# Patient Record
Sex: FEMALE | Race: OTHER | Employment: STUDENT | URBAN - METROPOLITAN AREA
[De-identification: names, ages, dates, MRNs, and addresses within clinical notes are randomized per-mention and may not be internally consistent; named-entity substitution may affect disease eponyms.]

---
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
## 2022-07-06 ENCOUNTER — HOSPITAL ENCOUNTER (OUTPATIENT)
Dept: PERIOP | Facility: HOSPITAL | Age: 22
Discharge: HOME | End: 2022-07-06
Attending: FAMILY MEDICINE
Payer: COMMERCIAL

## 2022-07-06 ENCOUNTER — HOSPITAL ENCOUNTER (OUTPATIENT)
Dept: CT IMAGING | Facility: HOSPITAL | Age: 22
Discharge: HOME | End: 2022-07-06
Attending: FAMILY MEDICINE
Payer: COMMERCIAL

## 2022-07-06 DIAGNOSIS — Z86.16 HISTORY OF 2019 NOVEL CORONAVIRUS DISEASE (COVID-19): ICD-10-CM

## 2022-07-06 DIAGNOSIS — R06.00 DOE (DYSPNEA ON EXERTION): ICD-10-CM

## 2022-07-06 LAB — CREAT BLD-MCNC: 0.8 MG/DL

## 2022-07-06 PROCEDURE — 71275 CT ANGIOGRAPHY CHEST: CPT | Performed by: FAMILY MEDICINE

## 2022-07-06 PROCEDURE — 82565 ASSAY OF CREATININE: CPT

## 2022-10-27 ENCOUNTER — OFFICE VISIT (OUTPATIENT)
Dept: FAMILY MEDICINE CLINIC | Facility: CLINIC | Age: 22
End: 2022-10-27
Payer: COMMERCIAL

## 2022-10-27 VITALS
HEART RATE: 80 BPM | OXYGEN SATURATION: 98 % | WEIGHT: 231 LBS | RESPIRATION RATE: 16 BRPM | DIASTOLIC BLOOD PRESSURE: 68 MMHG | BODY MASS INDEX: 37.12 KG/M2 | TEMPERATURE: 98 F | HEIGHT: 66 IN | SYSTOLIC BLOOD PRESSURE: 118 MMHG

## 2022-10-27 DIAGNOSIS — Z30.41 ENCOUNTER FOR SURVEILLANCE OF CONTRACEPTIVE PILLS: ICD-10-CM

## 2022-10-27 DIAGNOSIS — K21.9 GASTROESOPHAGEAL REFLUX DISEASE, UNSPECIFIED WHETHER ESOPHAGITIS PRESENT: ICD-10-CM

## 2022-10-27 DIAGNOSIS — N91.1 SECONDARY AMENORRHEA: ICD-10-CM

## 2022-10-27 DIAGNOSIS — Z00.00 WELL WOMAN EXAM (NO GYNECOLOGICAL EXAM): Primary | ICD-10-CM

## 2022-10-27 DIAGNOSIS — F32.1 CURRENT MODERATE EPISODE OF MAJOR DEPRESSIVE DISORDER WITHOUT PRIOR EPISODE (HCC): ICD-10-CM

## 2022-10-27 DIAGNOSIS — F41.9 ANXIETY: ICD-10-CM

## 2022-10-27 PROBLEM — G93.2 PSEUDOTUMOR CEREBRI: Status: ACTIVE | Noted: 2022-10-27

## 2022-10-27 PROCEDURE — 90471 IMMUNIZATION ADMIN: CPT | Performed by: FAMILY MEDICINE

## 2022-10-27 PROCEDURE — 3074F SYST BP LT 130 MM HG: CPT | Performed by: FAMILY MEDICINE

## 2022-10-27 PROCEDURE — 3008F BODY MASS INDEX DOCD: CPT | Performed by: FAMILY MEDICINE

## 2022-10-27 PROCEDURE — 99385 PREV VISIT NEW AGE 18-39: CPT | Performed by: FAMILY MEDICINE

## 2022-10-27 PROCEDURE — 3078F DIAST BP <80 MM HG: CPT | Performed by: FAMILY MEDICINE

## 2022-10-27 PROCEDURE — 90686 IIV4 VACC NO PRSV 0.5 ML IM: CPT | Performed by: FAMILY MEDICINE

## 2022-10-27 RX ORDER — TOPIRAMATE 25 MG/1
25 TABLET ORAL 2 TIMES DAILY
Qty: 180 TABLET | Refills: 2 | Status: CANCELLED | OUTPATIENT
Start: 2022-10-27

## 2022-10-27 RX ORDER — ESCITALOPRAM OXALATE 20 MG/1
20 TABLET ORAL DAILY
Qty: 90 TABLET | Refills: 1 | Status: SHIPPED | OUTPATIENT
Start: 2022-10-27 | End: 2023-10-22

## 2022-10-27 RX ORDER — NICOTINE POLACRILEX 4 MG/1
1 GUM, CHEWING ORAL DAILY
Qty: 30 TABLET | Refills: 0 | Status: SHIPPED | OUTPATIENT
Start: 2022-10-27 | End: 2022-11-26

## 2022-10-27 RX ORDER — ACETAZOLAMIDE 500 MG/1
500 CAPSULE, EXTENDED RELEASE ORAL 2 TIMES DAILY
Qty: 180 CAPSULE | Refills: 0 | Status: CANCELLED | OUTPATIENT
Start: 2022-10-27

## 2022-10-27 RX ORDER — ACETAZOLAMIDE 250 MG/1
250 TABLET ORAL 3 TIMES DAILY
COMMUNITY
Start: 2022-09-17

## 2022-10-27 RX ORDER — ACETAZOLAMIDE 250 MG/1
250 TABLET ORAL 3 TIMES DAILY
Refills: 0 | Status: CANCELLED | OUTPATIENT
Start: 2022-10-27

## 2022-10-27 RX ORDER — NORGESTIMATE AND ETHINYL ESTRADIOL 7DAYSX3 28
1 KIT ORAL DAILY
Qty: 84 TABLET | Refills: 3 | Status: SHIPPED | OUTPATIENT
Start: 2022-10-27

## 2022-11-27 DIAGNOSIS — K21.9 GASTROESOPHAGEAL REFLUX DISEASE, UNSPECIFIED WHETHER ESOPHAGITIS PRESENT: ICD-10-CM

## 2022-11-28 RX ORDER — OMEPRAZOLE 20 MG/1
CAPSULE, DELAYED RELEASE ORAL
Qty: 30 CAPSULE | Refills: 0 | Status: SHIPPED | OUTPATIENT
Start: 2022-11-28

## 2022-12-23 DIAGNOSIS — K21.9 GASTROESOPHAGEAL REFLUX DISEASE, UNSPECIFIED WHETHER ESOPHAGITIS PRESENT: ICD-10-CM

## 2022-12-23 RX ORDER — OMEPRAZOLE 20 MG/1
CAPSULE, DELAYED RELEASE ORAL
Qty: 30 CAPSULE | Refills: 0 | OUTPATIENT
Start: 2022-12-23

## 2022-12-31 ENCOUNTER — PATIENT MESSAGE (OUTPATIENT)
Dept: FAMILY MEDICINE CLINIC | Facility: CLINIC | Age: 22
End: 2022-12-31

## 2022-12-31 DIAGNOSIS — K21.9 GASTROESOPHAGEAL REFLUX DISEASE, UNSPECIFIED WHETHER ESOPHAGITIS PRESENT: Primary | ICD-10-CM

## 2023-01-03 RX ORDER — FAMOTIDINE 20 MG/1
20 TABLET, FILM COATED ORAL 2 TIMES DAILY
Qty: 180 TABLET | Refills: 0 | Status: SHIPPED | OUTPATIENT
Start: 2023-01-03

## 2023-01-03 NOTE — TELEPHONE ENCOUNTER
From: Thierry Walters  To: [id number], DO  Sent: 12/31/2022 10:01 AM CST  Subject: New medicine to replace omeprazole    Hello,     I have been taking omeprazole for over a month now so I do need to switch to something else. I do still need something bc when I tried to stop taking it about a week ago, my symptoms came back. Do you know what else I can be prescribed?      Thank you,  Imani Foy

## 2023-04-13 DIAGNOSIS — K21.9 GASTROESOPHAGEAL REFLUX DISEASE, UNSPECIFIED WHETHER ESOPHAGITIS PRESENT: ICD-10-CM

## 2023-04-18 RX ORDER — FAMOTIDINE 20 MG/1
TABLET, FILM COATED ORAL
Qty: 180 TABLET | Refills: 0 | Status: SHIPPED | OUTPATIENT
Start: 2023-04-18

## 2023-05-19 DIAGNOSIS — F32.1 CURRENT MODERATE EPISODE OF MAJOR DEPRESSIVE DISORDER WITHOUT PRIOR EPISODE (HCC): ICD-10-CM

## 2023-05-19 DIAGNOSIS — F41.9 ANXIETY: ICD-10-CM

## 2023-05-21 RX ORDER — ESCITALOPRAM OXALATE 20 MG/1
20 TABLET ORAL DAILY
Qty: 90 TABLET | Refills: 1 | Status: SHIPPED | OUTPATIENT
Start: 2023-05-21 | End: 2024-05-15

## 2023-10-15 NOTE — PROGRESS NOTES
CHIEF COMPLAINT:   Patient presents with:  Cough        HPI:   Lexus Bui is a 14year old female who presents for cough for one month. Productive and dry cough. Pain with breathing in. Breathing deep triggers a cough. Cough occurs in fits.  Worse a Pain management, allowed time to go to smoke to help keep calm and compliant with treatment, encouraged to voice any concerns  Problem: Adult Inpatient Plan of Care  Goal: Plan of Care Review  Outcome: Ongoing, Progressing  Flowsheets (Taken 10/15/2023 1810)  Plan of Care Reviewed With: patient  Goal: Patient-Specific Goal (Individualized)  Outcome: Ongoing, Progressing  Flowsheets (Taken 10/15/2023 1810)  Anxieties, Fears or Concerns: withdrawal / pain/ smoking  Individualized Care Needs: pain needs monitoring for s/s withdrawal  Goal: Absence of Hospital-Acquired Illness or Injury  Outcome: Ongoing, Progressing  Intervention: Identify and Manage Fall Risk  Flowsheets (Taken 10/15/2023 1810)  Safety Promotion/Fall Prevention:   assistive device/personal item within reach   side rails raised x 2  Intervention: Prevent Skin Injury  Flowsheets (Taken 10/15/2023 1810)  Body Position: position changed independently  Skin Protection: adhesive use limited  Intervention: Prevent and Manage VTE (Venous Thromboembolism) Risk  Flowsheets (Taken 10/15/2023 1810)  Range of Motion: active ROM (range of motion) encouraged  Goal: Optimal Comfort and Wellbeing  Outcome: Ongoing, Progressing  Intervention: Monitor Pain and Promote Comfort  Flowsheets (Taken 10/15/2023 1810)  Pain Management Interventions:   around-the-clock dosing utilized   awakened for pain meds per patient request   medication offered   relaxation techniques promoted   quiet environment facilitated  Intervention: Provide Person-Centered Care  Flowsheets (Taken 10/15/2023 1810)  Trust Relationship/Rapport:   care explained   choices provided   emotional support provided   empathic listening provided   questions answered   questions encouraged   reassurance provided   thoughts/feelings acknowledged  Goal: Readiness for Transition of Care  Outcome: Ongoing, Progressing     Problem: Impaired Wound Healing  Goal: Optimal Wound Healing  Outcome: Ongoing, Progressing    distress  SKIN: no rashes, no suspicious lesions  EYES: Conjunctiva clear. No scleral icterus. HENT: Atraumatic, normocephalic. TM's pearly gray, no bulging, no fluid b/l. Nostrils patent, nasal mucosa pink and non-inflamed. No erythema of the throat.

## 2023-10-26 DIAGNOSIS — Z30.41 ENCOUNTER FOR SURVEILLANCE OF CONTRACEPTIVE PILLS: ICD-10-CM

## 2023-10-26 RX ORDER — NORGESTIMATE AND ETHINYL ESTRADIOL 7DAYSX3 28
1 KIT ORAL DAILY
Qty: 84 TABLET | Refills: 3 | Status: SHIPPED | OUTPATIENT
Start: 2023-10-26

## 2023-10-26 NOTE — TELEPHONE ENCOUNTER
Requested Prescriptions     Signed Prescriptions Disp Refills    NORGESTIM-ETH ESTRAD TRIPHASIC 0.18/0.215/0.25 MG-35 MCG Oral Tab 84 tablet 3     Sig: TAKE 1 TABLET BY MOUTH EVERY DAY     Authorizing Provider: VINAY JEFFERSON     Ordering User: NANCY ROWLEY 10/27/2022     Patient was asked to follow-up in: Follow-up not documented in note    Appointment scheduled: Visit date not found     Medication refilled per protocol.

## 2023-11-16 DIAGNOSIS — F41.9 ANXIETY: ICD-10-CM

## 2023-11-16 DIAGNOSIS — F32.1 CURRENT MODERATE EPISODE OF MAJOR DEPRESSIVE DISORDER WITHOUT PRIOR EPISODE (HCC): ICD-10-CM

## 2023-11-16 NOTE — TELEPHONE ENCOUNTER
Refill request for:    Requested Prescriptions     Pending Prescriptions Disp Refills    ESCITALOPRAM 20 MG Oral Tab [Pharmacy Med Name: ESCITALOPRAM 20 MG TABLET] 90 tablet 1     Sig: TAKE 1 TABLET (20 MG TOTAL) BY MOUTH IN THE MORNING        Last Prescribed Quantity Refills   05/21/23 90TABS 1     LOV 10/27/22    Patient was asked to follow-up in: one year    Appointment due: October 2023    Appointment scheduled: Visit date not found    Medication not on protocol.      Routed to DO Sabrina for review  Routed to  to schedule RTC

## 2023-11-20 RX ORDER — ESCITALOPRAM OXALATE 20 MG/1
20 TABLET ORAL DAILY
Qty: 90 TABLET | Refills: 0 | Status: SHIPPED | OUTPATIENT
Start: 2023-11-20 | End: 2024-11-14

## 2024-02-14 NOTE — PROGRESS NOTES
HISTORY OF PRESENT ILLNESS  Chief Complaint   Patient presents with    Weight Problem     Recommendation from mom, no meds before, open for med.        Ericka Berger is a 24 year old female new to our office today for initiation of medical weight loss program.  Patient presents today with c/o excess weight.     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   Through middle school and high school did not have weight issues   Once covid hit and was home all the time and felt that she was eating junk food and started bad habits         Reason/goal for weight loss:  baseline weight 180 lb     Previous weight loss efforts in the past/medication:  tried exercise and in 2021 would go often with her mom and felt she lost 15-20 lb but felt that it was hard to maintain  Last year  would go thorugh her boyfriend then got busy and stopped going    Interest in Bariatric surgery: n/a     Barriers to weight loss: n/a           Wt Readings from Last 6 Encounters:   02/20/24 245 lb (111.1 kg)   10/27/22 231 lb (104.8 kg)   03/16/22 210 lb (95.3 kg)   12/15/21 196 lb (88.9 kg) (97%, Z= 1.88)*   11/01/21 197 lb (89.4 kg) (97%, Z= 1.90)*   08/12/20 198 lb (89.8 kg) (97%, Z= 1.94)*     * Growth percentiles are based on CDC (Girls, 2-20 Years) data.          Breakfast Lunch Dinner Snacks Fluids   Works in the morning at a [employer] shop     Frozen chicken nuggets         Does struggle with hunger throughout day   Feels her hunger is all or nothing       Social hx and lifestyle reviewed:    Work: college  Marital status: boyfriend, live together   Support: good   Tobacco use: neg  ETOH use: negative  Supplements: neg  Exercise: negative  Stress level: 4/10  Sleep hours and integrity:  8-9 hours on average     MEDICAL HISTORY  PMH reviewed:   Cardiac disorders:negative    Depression/anxiety:  YES   Glaucoma: negative   Kidney stones: negative   Eating disorder: negative   Migraines: Pseudotumor cerebri : diagnosed  summer 2020    Seizures: negative   Joint-related conditions:negative   Liver disease: negative   Renal disease: negative   Diabetes: negative  Thyroid disease: negative   Constipation: negative  DVT: negative    Family or personal history of Pancreatic issues / Medullary Thyroid Cancer: negative    History of bariatric surgery: negative     FMH reviewed: obesity in parent/s or sibling: YES     REVIEW OF SYSTEMS  GENERAL: feels well otherwise,   NECK: denies thickening   LUNGS: denies shortness of breath with exertion, no apnea   CARDIOVASCULAR: denies chest pain on exertion , denies palpitations or pedal edema  GI: denies abdominal pain.  No N/V/D/C  MUSCULOSKELETAL: denies joint pains   NEURO: denies headaches   PSYCH: denies change in behavior or mood, denies feeling sad or depressed     EXAM  /80   Pulse 81   Resp 16   Ht 5' 5\" (1.651 m)   Wt 245 lb (111.1 kg)   BMI 40.77 kg/m² ,   GENERAL: well developed, well nourished, in no apparent distress,   SKIN: warm, pink, dry without rashes to exposed area   EYES: conjunctiva pink, sclera non icteric, PERRL  HEENT: atraumatic, normocephalic, O/p: Mallampati score-   NECK: supple, non tender, no adenopathy, no thyromegaly,   LUNGS: CTA in all fields, breathing non labored  CARDIO: RRR without murmur, normal S1 and S2 without clicks or gallops, no pedal edema.  GI: rotund, no masses, HSM or tenderness  MUSCULOSKELETAL: grossly intact   NEURO: Oriented times three, full ROM of bilateral UE/LE  PSYCH: pleasant, cooperative, normal mood and affect,     Lab Results   Component Value Date    WBC 5.7 04/01/2017    RBC 4.93 (H) 04/01/2017    HGB 14.2 04/01/2017    HCT 41.4 04/01/2017    MCV 84.0 04/01/2017    MCH 28.8 04/01/2017    MCHC 34.3 04/01/2017    RDW 12.3 04/01/2017    .0 04/01/2017     Lab Results   Component Value Date    GLU 91 12/15/2021    BUN 12.0 12/15/2021    BUNCREA 17.0 12/15/2021    CREATSERUM 0.69 12/15/2021    ANIONGAP 7 08/31/2019     GFR 83 04/01/2017    GFRNAA 106 07/06/2022    GFRAA 123 07/06/2022    CA 9.5 12/15/2021    OSMOCALC 280 08/31/2019    ALKPHO 73 12/15/2021    AST 16 12/15/2021    ALT 9 12/15/2021    BILT 0.27 12/15/2021    TP 7.2 12/15/2021    ALB 4.3 12/15/2021    GLOBULIN 4.0 08/31/2019     12/15/2021    K 4.14 12/15/2021     12/15/2021    CO2 18.4 (L) 12/15/2021     No results found for: \"EAG\", \"A1C\"  Lab Results   Component Value Date    CHOLEST 119 04/01/2017    TRIG 53 04/01/2017    HDL 46 04/01/2017    LDL 62 04/01/2017    VLDL 11 04/01/2017    TCHDLRATIO 2.59 04/01/2017    NONHDLC 73 04/01/2017     Lab Results   Component Value Date    TSH 1.710 04/01/2017     No results found for: \"B12\", \"VITB12\"  Lab Results   Component Value Date    VITD 23.9 (L) 04/01/2017       Current Outpatient Medications on File Prior to Visit   Medication Sig Dispense Refill    Acetylcysteine 600 MG Oral Cap Take 600 mg by mouth 2 (two) times daily.      escitalopram 20 MG Oral Tab TAKE 1 TABLET (20 MG TOTAL) BY MOUTH IN THE MORNING 90 tablet 0    NORGESTIM-ETH ESTRAD TRIPHASIC 0.18/0.215/0.25 MG-35 MCG Oral Tab TAKE 1 TABLET BY MOUTH EVERY DAY 84 tablet 3    acetaZOLAMIDE 250 MG Oral Tab Take 1 tablet (250 mg total) by mouth 3 (three) times daily.      TOPIRAMATE 25 MG Oral Tab TAKE 1 TABLET BY MOUTH TWICE A  tablet 2     No current facility-administered medications on file prior to visit.       ASSESSMENT  Initial Weight Data and Goal Weight Loss:       Diagnoses and all orders for this visit:    Pseudotumor cerebri  -     B12 AND FOLATE; Future  -     Vitamin D; Future  -     [facility] Medical Nutrition Therapy DIETITIAN - [place] [facility]    Therapeutic drug monitoring  -     B12 AND FOLATE; Future  -     Vitamin D; Future  -     [facility] Medical Nutrition Therapy DIETITIAN - [place] [facility]    Morbid obesity (HCC)  -     B12 AND FOLATE; Future  -     Vitamin D; Future  -      [facility] Medical Nutrition Therapy DIETITIAN - [place] Location    Acanthosis nigricans  -     B12 AND FOLATE; Future  -     Vitamin D; Future  -     [facility] Medical Nutrition Therapy DIETITIAN - [place] [facility]    Vitamin D deficiency  -     B12 AND FOLATE; Future  -     Vitamin D; Future  -     [facility] Medical Nutrition Therapy DIETITIAN - [place] [facility]    Other orders  -     Tirzepatide-Weight Management (ZEPBOUND) 2.5 MG/0.5ML Subcutaneous Solution Auto-injector; Inject 2.5 mg into the skin once a week.  -     Tirzepatide-Weight Management (ZEPBOUND) 5 MG/0.5ML Subcutaneous Solution Auto-injector; Inject 5 mg into the skin once a week.        PLAN  Medication use and side effects reviewed with patient.  Medication contraindications: n/a   Reviewed A1c and fasting insuline   Recheck vitamin D  Check b12 and folate   Trial of zepbound  -advised of side effects and adverse effects of this medication  Injection teaching done in [facility]   Referral to dietitian   Hold on [facility]   Reviewed weight loss and improvement of pseudotumor cerebri  CANNOT TAKE WEGOVY or SAXENDA due to national back order  CANNOT TAKE QSYMIA due to increase in intracranial presssure   CANNOT TAKE CONTRAVE, on lexapro for deppression  Follow up with dietitian and psychologist as recommended.  Discussed the role of sleep and stress in weight management.  Labs orders as above.  Counseled on comprehensive weight loss plan including attention to nutrition, exercise and behavior/stress management for success. See patient instruction below for more details.  Weight Loss Consent to treat reviewed and signed.    There are no Patient Instructions on file for this visit.    No follow-ups on file.    Patient verbalizes understanding.    Jolie Batista MD

## 2024-02-19 DIAGNOSIS — F41.9 ANXIETY: ICD-10-CM

## 2024-02-19 DIAGNOSIS — F32.1 CURRENT MODERATE EPISODE OF MAJOR DEPRESSIVE DISORDER WITHOUT PRIOR EPISODE (HCC): ICD-10-CM

## 2024-02-20 ENCOUNTER — OFFICE VISIT (OUTPATIENT)
Dept: INTERNAL MEDICINE CLINIC | Facility: CLINIC | Age: 24
End: 2024-02-20
Payer: COMMERCIAL

## 2024-02-20 VITALS
WEIGHT: 245 LBS | DIASTOLIC BLOOD PRESSURE: 80 MMHG | BODY MASS INDEX: 40.82 KG/M2 | SYSTOLIC BLOOD PRESSURE: 118 MMHG | RESPIRATION RATE: 16 BRPM | HEIGHT: 65 IN | HEART RATE: 81 BPM

## 2024-02-20 DIAGNOSIS — L83 ACANTHOSIS NIGRICANS: ICD-10-CM

## 2024-02-20 DIAGNOSIS — Z51.81 THERAPEUTIC DRUG MONITORING: ICD-10-CM

## 2024-02-20 DIAGNOSIS — E55.9 VITAMIN D DEFICIENCY: ICD-10-CM

## 2024-02-20 DIAGNOSIS — E66.01 MORBID OBESITY (HCC): ICD-10-CM

## 2024-02-20 DIAGNOSIS — G93.2 PSEUDOTUMOR CEREBRI: Primary | ICD-10-CM

## 2024-02-20 PROBLEM — F98.8 NAIL BITING: Status: ACTIVE | Noted: 2023-05-26

## 2024-02-20 PROBLEM — F63.3 TRICHOTILLOMANIA: Status: ACTIVE | Noted: 2023-05-26

## 2024-02-20 PROBLEM — F42.2 MIXED OBSESSIONAL THOUGHTS AND ACTS: Status: ACTIVE | Noted: 2023-05-26

## 2024-02-20 PROCEDURE — 99204 OFFICE O/P NEW MOD 45 MIN: CPT | Performed by: INTERNAL MEDICINE

## 2024-02-20 RX ORDER — TIRZEPATIDE 5 MG/.5ML
5 INJECTION, SOLUTION SUBCUTANEOUS WEEKLY
Qty: 2 ML | Refills: 1 | Status: SHIPPED | OUTPATIENT
Start: 2024-02-20

## 2024-02-20 RX ORDER — TIRZEPATIDE 2.5 MG/.5ML
2.5 INJECTION, SOLUTION SUBCUTANEOUS WEEKLY
Qty: 2 ML | Refills: 0 | Status: SHIPPED | OUTPATIENT
Start: 2024-02-20

## 2024-02-20 NOTE — TELEPHONE ENCOUNTER
Refill request for:    Requested Prescriptions     Pending Prescriptions Disp Refills    ESCITALOPRAM 20 MG Oral Tab [Pharmacy Med Name: ESCITALOPRAM 20 MG TABLET] 90 tablet 0     Sig: TAKE 1 TABLET (20 MG TOTAL) BY MOUTH IN THE MORNING        Last Prescribed Quantity Refills   11/20/23 90 0     LOV Visit date not found     Patient was asked to follow-up in:     Appointment due:     Appointment scheduled: Visit date not found    Medication not on protocol.     Routed to , please assist pt with an appt. Pt is due for annual physical

## 2024-02-26 NOTE — PROGRESS NOTES
INITIAL OUTPATIENT NUTRITION CONSULTATION      Nutrition Assessment    Medical Diagnosis: Obesity    Physical Findings: fatigue    Client Age and Gender: 24 year old female        Labs:   No components found for: \"HGBA1C\"  Triglycerides   Date Value Ref Range Status   04/01/2017 53 <90 mg/dL Final     Comment:       Reference ranges are based on NHLBI Guidelines:   Acceptable:       < 90 mg/dL   Borderline high:    mg/dL   High:             > or = 130 mg/dL         LDL Cholesterol   Date Value Ref Range Status   04/01/2017 62 <100 mg/dL Final     Comment:       Reference ranges are based on NHLBI Guidelines:  Acceptable:      < 110 mg/dL  Borderline high: 110-129 mg/dL  High:            > or = 130 mg/dL         HDL Cholesterol   Date Value Ref Range Status   04/01/2017 46 >45 mg/dL Final     Comment:       Reference Ranges are based on NHBLI Guidelines:     Low:            <40 mg/dL     Borderline Low: 40-45 mg/dL     Acceptable:     >45 mg/dL          AST   Date Value Ref Range Status   12/15/2021 16 0 - 32 U/L Final     ALT   Date Value Ref Range Status   12/15/2021 9 0 - 33 U/L Final         Height:  Ht Readings from Last 1 Encounters:   02/20/24 5' 5\" (1.651 m)       Weight:   Wt Readings from Last 2 Encounters:   02/20/24 245 lb   10/27/22 231 lb       BMI Readings from Last 1 Encounters:   02/20/24 40.77 kg/m²       Weight change: No change     Diet/Weight History: increase in wt from 2020 to 2022, stable the past year   Keto     Current Diet:     Food/Beverage Intake:   Breakfast: banana or a breakfast bar    Lunch: bagel with cream cheese  Frozen chicken sandwich   Dinner: turkey club   Cheeseburger and chips   Baked potato with courtney cheese sour cream   Snacks: not snacking much   Beverages: water 1 gallon    Meal pattern: 3 meals/d, 1 snacks/d    Number of meals/week eaten at restaurants: 5 meals per week         Alcohol Intake: 1-2 drinks a month    Estimated current caloric intake: ?  cals/d    Estimated caloric needs for weight loss: 1574 to 2074 cals/d for 1-2 pounds/week weight loss    Physical Activity: walks around campus but not much else  Used to go to the gym - fell out of routine      Food Journal: Iliana     Spent 40 minutes in consultation with the patient.      Nutrition Intervention/Education:  Comprehensive nutrition education and evaluation provided for weight loss. Reviewed diet and weight hx. Pt graduates from college soon, wanting quick/easy nutritious meal ideas until then. Discussed options and assisted pt in making goals below.    Goals:   Add vegetables to dinner  Track intake with valarie a few times per week   Join a gym In the next couple of months     Monitoring/Evaluation:  Follow up appt scheduled with dietitian          Deena Aguirre RD, LDN

## 2024-02-27 ENCOUNTER — OFFICE VISIT (OUTPATIENT)
Dept: INTERNAL MEDICINE CLINIC | Facility: CLINIC | Age: 24
End: 2024-02-27
Payer: COMMERCIAL

## 2024-02-27 DIAGNOSIS — E55.9 VITAMIN D DEFICIENCY: ICD-10-CM

## 2024-02-27 DIAGNOSIS — L83 ACANTHOSIS NIGRICANS: ICD-10-CM

## 2024-02-27 DIAGNOSIS — G93.2 PSEUDOTUMOR CEREBRI: Primary | ICD-10-CM

## 2024-02-27 DIAGNOSIS — E66.01 MORBID OBESITY (HCC): ICD-10-CM

## 2024-02-27 DIAGNOSIS — Z51.81 THERAPEUTIC DRUG MONITORING: ICD-10-CM

## 2024-02-27 PROCEDURE — 97802 MEDICAL NUTRITION INDIV IN: CPT

## 2024-03-19 RX ORDER — TIRZEPATIDE 5 MG/.5ML
5 INJECTION, SOLUTION SUBCUTANEOUS WEEKLY
Qty: 2 ML | Refills: 1 | Status: SHIPPED | OUTPATIENT
Start: 2024-03-19

## 2024-03-19 NOTE — TELEPHONE ENCOUNTER
Requesting ZEPBOUND increase      Requested Prescriptions     Pending Prescriptions Disp Refills    Tirzepatide-Weight Management (ZEPBOUND) 5 MG/0.5ML Subcutaneous Solution Auto-injector 2 mL 1     Sig: Inject 5 mg into the skin once a week.       LOV: 02/20/2024  RTC:   Last Relevant Labs:   Filled: 02/20/2024 #2ml with 0 refills    Future Appointments   Date Time Provider Department Center   4/2/2024  2:00 PM Deena Aguirre RD [facility] [address]   5/6/2024 12:20 PM Jolie Batista MD [facility]   7/9/2024 12:00 PM Jolie Batista MD [facility]     Hello,      I have not had any side effects. My weight is still 245 lbs. I would like to move up to the next dose if possible. Thank you!

## 2024-03-20 DIAGNOSIS — F32.1 CURRENT MODERATE EPISODE OF MAJOR DEPRESSIVE DISORDER WITHOUT PRIOR EPISODE (HCC): ICD-10-CM

## 2024-03-20 DIAGNOSIS — F41.9 ANXIETY: ICD-10-CM

## 2024-03-20 RX ORDER — ESCITALOPRAM OXALATE 20 MG/1
20 TABLET ORAL DAILY
Qty: 90 TABLET | Refills: 0 | OUTPATIENT
Start: 2024-03-20 | End: 2025-03-15

## 2024-03-20 NOTE — TELEPHONE ENCOUNTER
Patient has not been seen in our office since Oct 2022 (1 1/2 years) is currently seeing weight loss clinic  Last dispensed by [facility] pharm on 11/20/23 #90  Routed to Dr Theodore for further directives on refill of Escitalopram

## 2024-03-21 NOTE — TELEPHONE ENCOUNTER
Requested Prescriptions     Pending Prescriptions Disp Refills    ESCITALOPRAM 20 MG Oral Tab [Pharmacy Med Name: ESCITALOPRAM 20 MG TABLET] 90 tablet 0     Sig: TAKE 1 TABLET (20 MG TOTAL) BY MOUTH IN THE MORNING     LOV 10/27/22    Patient was asked to follow-up in: 1 year/    Appointment scheduled: Visit date not found  Medication refilled per protocol.    Routed to  to schedule appt.  Routed to Giulia Theodore DO for review

## 2024-03-22 RX ORDER — ESCITALOPRAM OXALATE 20 MG/1
20 TABLET ORAL DAILY
Qty: 90 TABLET | Refills: 0 | OUTPATIENT
Start: 2024-03-22 | End: 2025-03-17

## 2024-03-26 ENCOUNTER — PATIENT MESSAGE (OUTPATIENT)
Dept: INTERNAL MEDICINE CLINIC | Facility: CLINIC | Age: 24
End: 2024-03-26

## 2024-03-27 NOTE — TELEPHONE ENCOUNTER
From: Ericka Berger  To: Jolie Batista  Sent: 3/26/2024 7:02 PM CDT  Subject: Zepbound Refill     Good evening,    It looks like all pharmacies are currently out of Zepbound and they don’t know when they will have it back in stock. The [facility] not able [facility]. My mom who used to see you had issues getting hers filled awhile ago because of the same issue so she was off the medicine for a couple weeks and gained some weight because of that. Should I be worried about that? Do you know if there’s another medicine that might be in stock?

## 2024-04-01 NOTE — TELEPHONE ENCOUNTER
I do not have other solutions other then to keep trying different pharmacies   They have not said this is a long term shortage

## 2024-04-15 ENCOUNTER — TELEMEDICINE (OUTPATIENT)
Dept: INTERNAL MEDICINE CLINIC | Facility: CLINIC | Age: 24
End: 2024-04-15
Payer: COMMERCIAL

## 2024-04-15 DIAGNOSIS — E66.01 CLASS 3 SEVERE OBESITY WITH BODY MASS INDEX (BMI) OF 40.0 TO 44.9 IN ADULT, UNSPECIFIED OBESITY TYPE, UNSPECIFIED WHETHER SERIOUS COMORBIDITY PRESENT (HCC): ICD-10-CM

## 2024-04-15 NOTE — PROGRESS NOTES
FOLLOW UP NUTRITION CONSULTATION    This consultation was conducted via real time interactive audio and video on 4/15/24.    Nutrition Assessment    Number of consultations with dietitian: 1    Height:  Ht Readings from Last 1 Encounters:   02/20/24 5' 5\" (1.651 m)       Weight:   Wt Readings from Last 2 Encounters:   02/20/24 245 lb   10/27/22 231 lb       BMI:  BMI Readings from Last 1 Encounters:   02/20/24 40.77 kg/m²       Weight change: Decrease of 5 lbs in the past 2 months          Diet/Lifestyle Modifications Following Previous Nutrition Consultation      Food journal: MND 2691-2538 calories, 50-60 g protein on average    Food/Beverage Intake: updated 4/15  Breakfast: 2 boiled eggs  with a piece of fruit   Lunch: BLT sandwich with sun chips   Dinner: chicken with a side of pasta sometimes with vegetables   Snacks: not snacking much   Beverages: water 1 gallon     Meal pattern: 3 meals/d, 1 snacks/d       Physical Activity: walks around campus but not much else  Used to go to the gym - fell out of routine      Spent 20 minutes in consultation with the patient.      Nutrition Assessment and Intervention/Education:    Nutrition follow up for weight loss was provided. Changes in Eating Habits: not over eating as much, eating more produce, tracking intake with valarie - was getting around 1500/1600 calories, 50-60 g protein. She would like to increase this amount of protein by adding in a snack. Assisted pt in making next goals.    Goals:   Add vegetables to dinner goal met 4/15  Track intake with valarie a few times per week goal met - 4/15, continued 20-30 g protein per meal   Add a high protein snack in the day such as greek yogurt or a protein shake   Join a gym In the next couple of months going on more walks     Monitoring/Evaluation: {Patient encouraged to schedule follow up appt        Deena Aguirre RD, SMITHAN

## 2024-04-26 RX ORDER — TIRZEPATIDE 5 MG/.5ML
5 INJECTION, SOLUTION SUBCUTANEOUS WEEKLY
Qty: 2 ML | Refills: 1 | Status: SHIPPED | OUTPATIENT
Start: 2024-04-26

## 2024-05-06 ENCOUNTER — TELEMEDICINE (OUTPATIENT)
Dept: INTERNAL MEDICINE CLINIC | Facility: CLINIC | Age: 24
End: 2024-05-06
Payer: COMMERCIAL

## 2024-05-06 DIAGNOSIS — Z51.81 THERAPEUTIC DRUG MONITORING: ICD-10-CM

## 2024-05-06 DIAGNOSIS — E66.01 MORBID OBESITY (HCC): ICD-10-CM

## 2024-05-06 DIAGNOSIS — E55.9 VITAMIN D DEFICIENCY: ICD-10-CM

## 2024-05-06 DIAGNOSIS — G93.2 PSEUDOTUMOR CEREBRI: ICD-10-CM

## 2024-05-06 PROCEDURE — 99214 OFFICE O/P EST MOD 30 MIN: CPT | Performed by: INTERNAL MEDICINE

## 2024-05-06 RX ORDER — TIRZEPATIDE 5 MG/.5ML
5 INJECTION, SOLUTION SUBCUTANEOUS WEEKLY
Qty: 2 ML | Refills: 2 | Status: SHIPPED | OUTPATIENT
Start: 2024-05-06

## 2024-05-06 NOTE — PROGRESS NOTES
HISTORY OF PRESENT ILLNESS  Chief Complaint   Patient presents with    Weight Check     Video         Ericka Berger is a 24 year old female here for follow up in medical weight loss program.     Denies chest pain, shortness of breath, dizziness, blurred vision, headache, paresthesia, nausea/vomiting.   Currently at 235   Down 10 lb   Eating more protein and vegetables  Eating more protein in the diet   Trying to eat consistent calories per day   Not eating lunch     Has been able to get the medicine a few times but not currenlty able to get the Rx and script   The first few months did not have side effects   But last week had an episode of nausea / diarrhea   Had been taking 5 mg for a few weeks     Headaches have been much better since starting medicaiton       Wt Readings from Last 6 Encounters:   02/20/24 245 lb (111.1 kg)   10/27/22 231 lb (104.8 kg)   03/16/22 210 lb (95.3 kg)   12/15/21 196 lb (88.9 kg) (97%, Z= 1.88)*   11/01/21 197 lb (89.4 kg) (97%, Z= 1.90)*   08/12/20 198 lb (89.8 kg) (97%, Z= 1.94)*     * Growth percentiles are based on CDC (Girls, 2-20 Years) data.            Breakfast Lunch Dinner Snacks Fluids              REVIEW OF SYSTEMS  GENERAL HEALTH: feels well otherwise, denied any fevers chills or night sweats   RESPIRATORY: denies shortness of breath   CARDIOVASCULAR: denies chest pain  GI: denies abdominal pain    EXAM  There were no vitals taken for this visit.  GENERAL: well developed, well nourished,in no apparent distress, A/O x3  SKIN: no rashes,no suspicious lesions  HEENT: atraumatic, normocephalic, OP-clear, PERRL  NECK: supple,no adenopathy  LUNGS: clear to auscultation bilaterally   CARDIO: RRR without murmur  GI: good BS's,NT/ND, no masses or HSM  EXTREMITIES: no cyanosis, no clubbing, no edema    Lab Results   Component Value Date    WBC 5.7 04/01/2017    RBC 4.93 (H) 04/01/2017    HGB 14.2 04/01/2017    HCT 41.4 04/01/2017    MCV 84.0 04/01/2017    MCH 28.8 04/01/2017     MCHC 34.3 04/01/2017    RDW 12.3 04/01/2017    .0 04/01/2017     Lab Results   Component Value Date    GLU 91 12/15/2021    BUN 12.0 12/15/2021    BUNCREA 17.0 12/15/2021    CREATSERUM 0.69 12/15/2021    ANIONGAP 7 08/31/2019    GFR 83 04/01/2017    GFRNAA 106 07/06/2022    GFRAA 123 07/06/2022    CA 9.5 12/15/2021    OSMOCALC 280 08/31/2019    ALKPHO 73 12/15/2021    AST 16 12/15/2021    ALT 9 12/15/2021    BILT 0.27 12/15/2021    TP 7.2 12/15/2021    ALB 4.3 12/15/2021    GLOBULIN 4.0 08/31/2019     12/15/2021    K 4.14 12/15/2021     12/15/2021    CO2 18.4 (L) 12/15/2021     No results found for: \"EAG\", \"A1C\"  Lab Results   Component Value Date    CHOLEST 119 04/01/2017    TRIG 53 04/01/2017    HDL 46 04/01/2017    LDL 62 04/01/2017    VLDL 11 04/01/2017    TCHDLRATIO 2.59 04/01/2017    NONHDLC 73 04/01/2017     Lab Results   Component Value Date    TSH 1.710 04/01/2017     No results found for: \"B12\", \"VITB12\"  Lab Results   Component Value Date    VITD 23.9 (L) 04/01/2017       Current Outpatient Medications on File Prior to Visit   Medication Sig Dispense Refill    Tirzepatide-Weight Management (ZEPBOUND) 5 MG/0.5ML Subcutaneous Solution Auto-injector Inject 5 mg into the skin once a week. 2 mL 1    Acetylcysteine 600 MG Oral Cap Take 600 mg by mouth 2 (two) times daily.      escitalopram 20 MG Oral Tab TAKE 1 TABLET (20 MG TOTAL) BY MOUTH IN THE MORNING 90 tablet 0    NORGESTIM-ETH ESTRAD TRIPHASIC 0.18/0.215/0.25 MG-35 MCG Oral Tab TAKE 1 TABLET BY MOUTH EVERY DAY 84 tablet 3    acetaZOLAMIDE 250 MG Oral Tab Take 1 tablet (250 mg total) by mouth 3 (three) times daily.      TOPIRAMATE 25 MG Oral Tab TAKE 1 TABLET BY MOUTH TWICE A  tablet 2     No current facility-administered medications on file prior to visit.       ASSESSMENT  Analyzed weight data:       Diagnoses and all orders for this visit:    BMI 40.0-44.9, adult (HCC)    Pseudotumor cerebri    Morbid obesity  (HCC)    Therapeutic drug monitoring    Vitamin D deficiency    Other orders  -     Tirzepatide-Weight Management (ZEPBOUND) 5 MG/0.5ML Subcutaneous Solution Auto-injector; Inject 5 mg into the skin once a week.        PLAN     Wt Readings from Last 6 Encounters:   02/20/24 245 lb (111.1 kg)   10/27/22 231 lb (104.8 kg)   03/16/22 210 lb (95.3 kg)   12/15/21 196 lb (88.9 kg) (97%, Z= 1.88)*   11/01/21 197 lb (89.4 kg) (97%, Z= 1.90)*   08/12/20 198 lb (89.8 kg) (97%, Z= 1.94)*     * Growth percentiles are based on [facility] (Girls, 2-20 Years) data.   Total time spent on chart review, pre-charting, obtaining history, counseling, and educating, reviewing labs was 30 minutes.    Down 10 lb   Continue zepbound at 5 mg   -advised of side effects and adverse effects of this medication  Goal: strength training at the gym   Increase protein   Doing well with strength   Headaches have gone down significantly!!   Continue vitamin D supplementation  Nutrition: low carb diet/ recommended to eat breakfast daily/ regular protein intake  Medication use and side effects reviewed with patient.  Medication contraindications: n/a  Follow up with dietitian and psychologist as recommended.  Discussed the role of sleep and stress in weight management.  Counseled on comprehensive weight loss plan including attention to nutrition, exercise and behavior/stress management for success. See patient instruction below for more details.  Discussed strategies to overcome barriers to successful weight loss and weight maintenance  FITTE: ACSM recommendations (150-300 minutes/ week in active weight loss)   Weight Loss consent to treat reviewed and signed    There are no Patient Instructions on file for this visit.    No follow-ups on file.    Patient verbalizes understanding.    Jolie Batista MD

## 2024-07-09 ENCOUNTER — OFFICE VISIT (OUTPATIENT)
Dept: INTERNAL MEDICINE CLINIC | Facility: CLINIC | Age: 24
End: 2024-07-09
Payer: COMMERCIAL

## 2024-07-09 VITALS
SYSTOLIC BLOOD PRESSURE: 118 MMHG | BODY MASS INDEX: 38.99 KG/M2 | WEIGHT: 234 LBS | RESPIRATION RATE: 16 BRPM | DIASTOLIC BLOOD PRESSURE: 72 MMHG | HEART RATE: 94 BPM | HEIGHT: 65 IN

## 2024-07-09 DIAGNOSIS — E66.01 MORBID OBESITY (HCC): ICD-10-CM

## 2024-07-09 DIAGNOSIS — E55.9 VITAMIN D DEFICIENCY: ICD-10-CM

## 2024-07-09 DIAGNOSIS — G93.2 PSEUDOTUMOR CEREBRI: ICD-10-CM

## 2024-07-09 DIAGNOSIS — Z51.81 THERAPEUTIC DRUG MONITORING: Primary | ICD-10-CM

## 2024-07-09 PROCEDURE — 99214 OFFICE O/P EST MOD 30 MIN: CPT | Performed by: INTERNAL MEDICINE

## 2024-07-09 RX ORDER — TIRZEPATIDE 10 MG/.5ML
10 INJECTION, SOLUTION SUBCUTANEOUS WEEKLY
Qty: 2 ML | Refills: 0 | Status: SHIPPED | OUTPATIENT
Start: 2024-07-09 | End: 2024-07-31

## 2024-07-09 RX ORDER — TIRZEPATIDE 7.5 MG/.5ML
7.5 INJECTION, SOLUTION SUBCUTANEOUS WEEKLY
Qty: 2 ML | Refills: 1 | Status: SHIPPED | OUTPATIENT
Start: 2024-07-09 | End: 2024-07-31

## 2024-07-09 NOTE — PROGRESS NOTES
HISTORY OF PRESENT ILLNESS  Chief Complaint   Patient presents with    Weight Check     -11       Ericka Berger is a 24 year old female here for follow up in medical weight loss program.     Denies chest pain, shortness of breath, dizziness, blurred vision, headache, paresthesia, nausea/vomiting.     Has cut back on overeating   Feeling hunger is much less.   Has been paying attention to her fullness much more   Making better choices with her eating   Felt that when she first started 5 mg had some diarrhea   Since that time tolerating     Did not complete labwork   Moved and trying to do more walks with dog and boyfriend   Trying to get there most nights intention to be acative   WOuld like to get a gym membership       Headaches continue to improve!   Not currently taking vitamin D      Wt Readings from Last 6 Encounters:   07/09/24 234 lb (106.1 kg)   02/20/24 245 lb (111.1 kg)   10/27/22 231 lb (104.8 kg)   03/16/22 210 lb (95.3 kg)   12/15/21 196 lb (88.9 kg) (97%, Z= 1.88)*   11/01/21 197 lb (89.4 kg) (97%, Z= 1.90)*     * Growth percentiles are based on CDC (Girls, 2-20 Years) data.            Breakfast Lunch Dinner Snacks Fluids   Reviewed            REVIEW OF SYSTEMS  GENERAL HEALTH: feels well otherwise, denied any fevers chills or night sweats   RESPIRATORY: denies shortness of breath   CARDIOVASCULAR: denies chest pain  GI: denies abdominal pain    EXAM  /72   Pulse 94   Resp 16   Ht 5' 5\" (1.651 m)   Wt 234 lb (106.1 kg)   LMP 06/22/2024   BMI 38.94 kg/m²   GENERAL: well developed, well nourished,in no apparent distress, A/O x3  SKIN: no rashes,no suspicious lesions  HEENT: atraumatic, normocephalic, OP-clear, PERRL  NECK: supple,no adenopathy  LUNGS: clear to auscultation bilaterally   CARDIO: RRR without murmur  GI: good BS's,NT/ND, no masses or HSM  EXTREMITIES: no cyanosis, no clubbing, no edema    Lab Results   Component Value Date    WBC 5.7 04/01/2017    RBC 4.93 (H)  04/01/2017    HGB 14.2 04/01/2017    HCT 41.4 04/01/2017    MCV 84.0 04/01/2017    MCH 28.8 04/01/2017    MCHC 34.3 04/01/2017    RDW 12.3 04/01/2017    .0 04/01/2017     Lab Results   Component Value Date    GLU 91 12/15/2021    BUN 12.0 12/15/2021    BUNCREA 17.0 12/15/2021    CREATSERUM 0.69 12/15/2021    ANIONGAP 7 08/31/2019    GFR 83 04/01/2017    GFRNAA 106 07/06/2022    GFRAA 123 07/06/2022    CA 9.5 12/15/2021    OSMOCALC 280 08/31/2019    ALKPHO 73 12/15/2021    AST 16 12/15/2021    ALT 9 12/15/2021    BILT 0.27 12/15/2021    TP 7.2 12/15/2021    ALB 4.3 12/15/2021    GLOBULIN 4.0 08/31/2019     12/15/2021    K 4.14 12/15/2021     12/15/2021    CO2 18.4 (L) 12/15/2021     No results found for: \"EAG\", \"A1C\"  Lab Results   Component Value Date    CHOLEST 119 04/01/2017    TRIG 53 04/01/2017    HDL 46 04/01/2017    LDL 62 04/01/2017    VLDL 11 04/01/2017    TCHDLRATIO 2.59 04/01/2017    NONHDLC 73 04/01/2017     Lab Results   Component Value Date    TSH 1.710 04/01/2017     No results found for: \"B12\", \"VITB12\"  Lab Results   Component Value Date    VITD 23.9 (L) 04/01/2017       Current Outpatient Medications on File Prior to Visit   Medication Sig Dispense Refill    Tirzepatide-Weight Management (ZEPBOUND) 5 MG/0.5ML Subcutaneous Solution Auto-injector Inject 5 mg into the skin once a week. 2 mL 2    Acetylcysteine 600 MG Oral Cap Take 600 mg by mouth 2 (two) times daily.      escitalopram 20 MG Oral Tab TAKE 1 TABLET (20 MG TOTAL) BY MOUTH IN THE MORNING 90 tablet 0    NORGESTIM-ETH ESTRAD TRIPHASIC 0.18/0.215/0.25 MG-35 MCG Oral Tab TAKE 1 TABLET BY MOUTH EVERY DAY 84 tablet 3    acetaZOLAMIDE 250 MG Oral Tab Take 1 tablet (250 mg total) by mouth 3 (three) times daily.      TOPIRAMATE 25 MG Oral Tab TAKE 1 TABLET BY MOUTH TWICE A  tablet 2     No current facility-administered medications on file prior to visit.       ASSESSMENT  Analyzed weight data:       Diagnoses and all  orders for this visit:    Therapeutic drug monitoring    Morbid obesity (HCC)    Pseudotumor cerebri    Vitamin D deficiency    Other orders  -     Tirzepatide-Weight Management (ZEPBOUND) 7.5 MG/0.5ML Subcutaneous Solution Auto-injector; Inject 7.5 mg into the skin once a week for 4 doses.  -     Tirzepatide-Weight Management (ZEPBOUND) 10 MG/0.5ML Subcutaneous Solution Auto-injector; Inject 10 mg into the skin once a week for 4 doses.          PLAN     Wt Readings from Last 6 Encounters:   07/09/24 234 lb (106.1 kg)   02/20/24 245 lb (111.1 kg)   10/27/22 231 lb (104.8 kg)   03/16/22 210 lb (95.3 kg)   12/15/21 196 lb (88.9 kg) (97%, Z= 1.88)*   11/01/21 197 lb (89.4 kg) (97%, Z= 1.90)*     * Growth percentiles are based on [facility] (Girls, 2-20 Years) data.   Total time spent on chart review, pre-charting, obtaining history, counseling, and educating, reviewing labs was 30 minutes.  Weight max 245 lb 2/2024   Add strength training  Down 11 lb   Continue zepbound increase to 7.5 mg q weekly then 10 mg   -advised of side effects and adverse effects of this medication  Goal: strength training  Increase protein   Doing well with strength   Headaches have continued to improve significantly   Vit D- recheck   Vit B12 - recheck   Nutrition: low carb diet/ recommended to eat breakfast daily/ regular protein intake  Medication use and side effects reviewed with patient.  Medication contraindications: n/a  Follow up with dietitian and psychologist as recommended.  Discussed the role of sleep and stress in weight management.  Counseled on comprehensive weight loss plan including attention to nutrition, exercise and behavior/stress management for success. See patient instruction below for more details.  Discussed strategies to overcome barriers to successful weight loss and weight maintenance  FITTE: ACSM recommendations (150-300 minutes/ week in active weight loss)   Weight Loss consent to treat reviewed and signed    Patient  Instructions   Move with mohsen     FORM fitness [facility] body     No follow-ups on file.    Patient verbalizes understanding.    Jolie Batista MD

## 2024-08-27 ENCOUNTER — PATIENT MESSAGE (OUTPATIENT)
Dept: INTERNAL MEDICINE CLINIC | Facility: CLINIC | Age: 24
End: 2024-08-27

## 2024-08-27 NOTE — TELEPHONE ENCOUNTER
From: Ericka Berger  To: Jolie Batista  Sent: 8/27/2024 12:59 PM CDT  Subject: Zepound refill question    Good afternoon,     The 10 mg pound won’t be in stock for quite a bit it seems like, so would you mind sending a 7.5 mg prescription to the [facility] on [facility] [place] [employer] just to help for the time being. Thank you.

## 2024-08-28 RX ORDER — TIRZEPATIDE 7.5 MG/.5ML
7.5 INJECTION, SOLUTION SUBCUTANEOUS WEEKLY
Qty: 2 ML | Refills: 0 | Status: SHIPPED | OUTPATIENT
Start: 2024-08-28 | End: 2024-09-19

## 2024-08-29 RX ORDER — TIRZEPATIDE 12.5 MG/.5ML
12.5 INJECTION, SOLUTION SUBCUTANEOUS WEEKLY
Qty: 2 ML | Refills: 0 | Status: SHIPPED | OUTPATIENT
Start: 2024-08-29 | End: 2024-09-20

## 2024-08-29 NOTE — TELEPHONE ENCOUNTER
7.5 mg zepbound was sent yesterday to [facility]  I called them to see what the issue is  I spoke to Lake.  7.5 mg is a repeat dose and insurance will not cover since she has been on it.  They cannot obtain 10 mg dose, per [facility] no [facility] can.   What do you want to do   37.1

## 2024-08-29 NOTE — TELEPHONE ENCOUNTER
Please let us know if 7.5 mg is covered first   soft/nontender/no distention no rigidity/no guarding/soft/PEG tube in place/no distention

## 2024-08-29 NOTE — TELEPHONE ENCOUNTER
I would increase to 12.5   If she has concerns she can wait till 10 is back in stock but can only resume 10 if she's off for for less then 2 weeks, longer then 2 weeks will have to resume from beginning 2.5

## 2024-09-16 ENCOUNTER — TELEMEDICINE (OUTPATIENT)
Dept: INTERNAL MEDICINE CLINIC | Facility: CLINIC | Age: 24
End: 2024-09-16
Payer: COMMERCIAL

## 2024-09-16 DIAGNOSIS — G93.2 PSEUDOTUMOR CEREBRI: ICD-10-CM

## 2024-09-16 DIAGNOSIS — E66.01 MORBID OBESITY (HCC): ICD-10-CM

## 2024-09-16 DIAGNOSIS — Z51.81 THERAPEUTIC DRUG MONITORING: Primary | ICD-10-CM

## 2024-09-16 DIAGNOSIS — E55.9 VITAMIN D DEFICIENCY: ICD-10-CM

## 2024-09-16 RX ORDER — TIRZEPATIDE 7.5 MG/.5ML
7.5 INJECTION, SOLUTION SUBCUTANEOUS WEEKLY
Qty: 2 ML | Refills: 0 | Status: SHIPPED | OUTPATIENT
Start: 2024-09-16 | End: 2024-10-08

## 2024-09-16 RX ORDER — TIRZEPATIDE 10 MG/.5ML
10 INJECTION, SOLUTION SUBCUTANEOUS WEEKLY
Qty: 2 ML | Refills: 0 | Status: SHIPPED | OUTPATIENT
Start: 2024-09-16

## 2024-09-16 RX ORDER — TIRZEPATIDE 5 MG/.5ML
5 INJECTION, SOLUTION SUBCUTANEOUS WEEKLY
Qty: 2 ML | Refills: 0 | Status: SHIPPED | OUTPATIENT
Start: 2024-09-16

## 2024-09-16 NOTE — PROGRESS NOTES
HISTORY OF PRESENT ILLNESS  Chief Complaint   Patient presents with    Weight Check     Video         Ericka Berger is a 24 year old female here for follow up in medical weight loss program.     Denies chest pain, shortness of breath, dizziness, blurred vision, headache, paresthesia, nausea/vomiting.       Sister recently getting    Hvent been zepbound because it was shortage issues.   Last dose was 3-4 weeks ago.     Was as low as 224 lb   Current weight 228 lb     Reviewed 24 dietary recall       [facility] Follow Up    General Information  Success Moment: Loosing 20 lbs  Nutrition Recall  Breakfast: Eggs with vegetables and seasoned potatoes Lunch: Chicken   sandwich with mixed fruit and vegetables   Dinner: Bratwurst with chips and pasta salad Snacks: 2 small Turkey sticks   and banana   Fluids: 1 gallon of water Dining Out: 1   Exercise   Patient stated exercises # days/week: 4  Patient stated perceived level of   exertion: 3 Anaerobic Days: 1   Aerobic Days: 4   Patient stated average level of stress: 5  Sleep   Patient stated # hours uninterrupted sleep: 9   Patient stated feels   restful: Yes      Goals: Getting back on zepbound                Wt Readings from Last 6 Encounters:   07/09/24 234 lb (106.1 kg)   02/20/24 245 lb (111.1 kg)   10/27/22 231 lb (104.8 kg)   03/16/22 210 lb (95.3 kg)   12/15/21 196 lb (88.9 kg) (97%, Z= 1.88)*   11/01/21 197 lb (89.4 kg) (97%, Z= 1.90)*     * Growth percentiles are based on CDC (Girls, 2-20 Years) data.            Breakfast Lunch Dinner Snacks Fluids   Reviewed            REVIEW OF SYSTEMS  GENERAL HEALTH: feels well otherwise, denied any fevers chills or night sweats   RESPIRATORY: denies shortness of breath   CARDIOVASCULAR: denies chest pain  GI: denies abdominal pain    EXAM  LMP 06/22/2024   EXAM  Reviewed most recent set of vitals   Physical Exam:  alert, appears stated age and cooperative, Normocephalic, without obvious abnormality, atraumatic, lips,  mucosa, and tongue normal; teeth and gums normal, Speaking in full sentences comfortably, Normal work of breathing, Skin color, texture, turgor normal. No rashes or lesions and age appropriate, normal, logical connections and person, place and time/date      Lab Results   Component Value Date    WBC 5.7 04/01/2017    RBC 4.93 (H) 04/01/2017    HGB 14.2 04/01/2017    HCT 41.4 04/01/2017    MCV 84.0 04/01/2017    MCH 28.8 04/01/2017    MCHC 34.3 04/01/2017    RDW 12.3 04/01/2017    .0 04/01/2017     Lab Results   Component Value Date    GLU 91 12/15/2021    BUN 12.0 12/15/2021    BUNCREA 17.0 12/15/2021    CREATSERUM 0.69 12/15/2021    ANIONGAP 7 08/31/2019    GFR 83 04/01/2017    GFRNAA 106 07/06/2022    GFRAA 123 07/06/2022    CA 9.5 12/15/2021    OSMOCALC 280 08/31/2019    ALKPHO 73 12/15/2021    AST 16 12/15/2021    ALT 9 12/15/2021    BILT 0.27 12/15/2021    TP 7.2 12/15/2021    ALB 4.3 12/15/2021    GLOBULIN 4.0 08/31/2019     12/15/2021    K 4.14 12/15/2021     12/15/2021    CO2 18.4 (L) 12/15/2021     No results found for: \"EAG\", \"A1C\"  Lab Results   Component Value Date    CHOLEST 119 04/01/2017    TRIG 53 04/01/2017    HDL 46 04/01/2017    LDL 62 04/01/2017    VLDL 11 04/01/2017    TCHDLRATIO 2.59 04/01/2017    NONHDLC 73 04/01/2017     Lab Results   Component Value Date    TSH 1.710 04/01/2017     No results found for: \"B12\", \"VITB12\"  Lab Results   Component Value Date    VITD 23.9 (L) 04/01/2017       Current Outpatient Medications on File Prior to Visit   Medication Sig Dispense Refill    Acetylcysteine 600 MG Oral Cap Take 600 mg by mouth 2 (two) times daily.      escitalopram 20 MG Oral Tab TAKE 1 TABLET (20 MG TOTAL) BY MOUTH IN THE MORNING 90 tablet 0    NORGESTIM-ETH ESTRAD TRIPHASIC 0.18/0.215/0.25 MG-35 MCG Oral Tab TAKE 1 TABLET BY MOUTH EVERY DAY 84 tablet 3    acetaZOLAMIDE 250 MG Oral Tab Take 1 tablet (250 mg total) by mouth 3 (three) times daily.      TOPIRAMATE 25 MG Oral  Tab TAKE 1 TABLET BY MOUTH TWICE A  tablet 2     No current facility-administered medications on file prior to visit.       ASSESSMENT  Analyzed weight data:       Diagnoses and all orders for this visit:    Therapeutic drug monitoring    Morbid obesity (HCC)    Vitamin D deficiency    Pseudotumor cerebri    Other orders  -     Tirzepatide-Weight Management (ZEPBOUND) 5 MG/0.5ML Subcutaneous Solution Auto-injector; Inject 5 mg into the skin once a week.  -     Tirzepatide-Weight Management (ZEPBOUND) 7.5 MG/0.5ML Subcutaneous Solution Auto-injector; Inject 7.5 mg into the skin once a week for 4 doses.  -     Tirzepatide-Weight Management (ZEPBOUND) 10 MG/0.5ML Subcutaneous Solution Auto-injector; Inject 10 mg into the skin once a week.            PLAN     Wt Readings from Last 6 Encounters:   07/09/24 234 lb (106.1 kg)   02/20/24 245 lb (111.1 kg)   10/27/22 231 lb (104.8 kg)   03/16/22 210 lb (95.3 kg)   12/15/21 196 lb (88.9 kg) (97%, Z= 1.88)*   11/01/21 197 lb (89.4 kg) (97%, Z= 1.90)*     * Growth percentiles are based on CDC (Girls, 2-20 Years) data.   Total time spent on chart review, pre-charting, obtaining history, counseling, and educating, reviewing labs was 30 minutes.  Here for medication resumption   Weight max 245 lb 2/2024   Current weight 228 lb   Resume zepbound   Was out for shortages   Start at 5 mg and titrate accordingly   Add strength training  Goal: strength training  Increase protein   Doing well with strength   Headaches have continued to improve significantly   Vit D- recheck   Vit B12 - recheck   Nutrition: low carb diet/ recommended to eat breakfast daily/ regular protein intake  Medication use and side effects reviewed with patient.  Medication contraindications: n/a  Follow up with dietitian and psychologist as recommended.  Discussed the role of sleep and stress in weight management.  Counseled on comprehensive weight loss plan including attention to nutrition, exercise and  behavior/stress management for success. See patient instruction below for more details.  Discussed strategies to overcome barriers to successful weight loss and weight maintenance  FITTE: ACSM recommendations (150-300 minutes/ week in active weight loss)   Weight Loss consent to treat reviewed and signed    There are no Patient Instructions on file for this visit.    No follow-ups on file.    Patient verbalizes understanding.    Jolie Batista MD

## 2024-09-27 RX ORDER — TIRZEPATIDE 7.5 MG/.5ML
7.5 INJECTION, SOLUTION SUBCUTANEOUS WEEKLY
Qty: 2 ML | Refills: 0 | Status: CANCELLED | OUTPATIENT
Start: 2024-09-27 | End: 2024-10-19

## 2024-10-08 DIAGNOSIS — E66.01 MORBID OBESITY (HCC): Primary | ICD-10-CM

## 2024-10-08 RX ORDER — TIRZEPATIDE 7.5 MG/.5ML
7.5 INJECTION, SOLUTION SUBCUTANEOUS WEEKLY
Qty: 2 ML | Refills: 0 | Status: SHIPPED | OUTPATIENT
Start: 2024-10-08 | End: 2024-10-30

## 2024-10-08 NOTE — TELEPHONE ENCOUNTER
Script for Zepbound 7.5mg sent to [facility] pharmacy in [place], [place] per patient's request.  MCM sent to patient informing.

## 2024-10-21 ENCOUNTER — PATIENT MESSAGE (OUTPATIENT)
Dept: INTERNAL MEDICINE CLINIC | Facility: CLINIC | Age: 24
End: 2024-10-21

## 2024-10-21 DIAGNOSIS — E66.01 MORBID OBESITY (HCC): ICD-10-CM

## 2024-10-21 NOTE — TELEPHONE ENCOUNTER
Needs 7.5 sent to a different pharmacy hasn't been on it yet. Waiting to see what her last dose was and when she last took it.

## 2024-10-22 RX ORDER — TIRZEPATIDE 7.5 MG/.5ML
7.5 INJECTION, SOLUTION SUBCUTANEOUS WEEKLY
Qty: 2 ML | Refills: 0 | Status: SHIPPED | OUTPATIENT
Start: 2024-10-22 | End: 2024-11-13

## 2024-10-24 NOTE — TELEPHONE ENCOUNTER
PA in epic closed - stating a pa is already in process?  I checked with [facility] and no pa done by them  I tried to initiate in CMM and got this message    Information regarding your request  PA has already submitted and is in process for this patient and drug.;CaseId:;Status:;

## 2024-10-25 NOTE — TELEPHONE ENCOUNTER
I called Umesh and this # is for procedures - not pharmacy    [id number]       I called [facility]   Zepbound 7.5 mg dose 2 ml for 28 days  Fax coming to complete PA  Case # 0    I then spoke to rep about this since she is re-initiating treatment  KIYA done on the phone as well as initial PA for medicine    Now approved for medicine  Case #[id number] from 9/25/24 to 6/22/2025    Quantity Limit case approved  Case # [id number]  From 9/25/24 to 11/24/2024

## 2024-11-11 ENCOUNTER — PATIENT MESSAGE (OUTPATIENT)
Dept: INTERNAL MEDICINE CLINIC | Facility: CLINIC | Age: 24
End: 2024-11-11

## 2024-11-11 DIAGNOSIS — E66.01 MORBID OBESITY (HCC): Primary | ICD-10-CM

## 2024-11-12 RX ORDER — TIRZEPATIDE 10 MG/.5ML
10 INJECTION, SOLUTION SUBCUTANEOUS WEEKLY
Qty: 2 ML | Refills: 2 | Status: SHIPPED | OUTPATIENT
Start: 2024-11-12

## 2024-11-12 NOTE — TELEPHONE ENCOUNTER
NS-please advise  -currently on Zepbound 7.5mg  -current weight 216 lbs  (9/16/24 VV reported weight 228 lbs  -had some nausea with first few doses of 7.5mg- but is \"better now\"    Okay to increase to Zepbound 10mg?    -Patient has 1-month supply at home and would use this supply first- no need for script-[facility] has appt with you 12/2/24.

## 2024-11-12 NOTE — TELEPHONE ENCOUNTER
Requesting zepbound increase  LOV: 9/16/24  RTC: not noted  Last Relevant Labs: na  Filled: 10/22/24 #2ml with 0 refills  7.5 mg dose    Future Appointments   Date Time Provider Department Center   12/2/2024 12:00 PM Jolie Batista MD [facility]   2/25/2025 10:20 AM Jolie Batista MD [facility]

## 2024-12-20 ENCOUNTER — PATIENT MESSAGE (OUTPATIENT)
Dept: INTERNAL MEDICINE CLINIC | Facility: CLINIC | Age: 24
End: 2024-12-20

## 2025-01-01 ENCOUNTER — PATIENT MESSAGE (OUTPATIENT)
Dept: INTERNAL MEDICINE CLINIC | Facility: CLINIC | Age: 25
End: 2025-01-01

## 2025-01-15 ENCOUNTER — PATIENT MESSAGE (OUTPATIENT)
Dept: INTERNAL MEDICINE CLINIC | Facility: CLINIC | Age: 25
End: 2025-01-15

## 2025-02-25 NOTE — PROGRESS NOTES
HISTORY OF PRESENT ILLNESS  Chief Complaint   Patient presents with    Weight Check     Down 26        Ericka Berger is a 25 year old female here for follow up in medical weight loss program.     Denies chest pain, shortness of breath, dizziness, blurred vision, headache, paresthesia, nausea/vomiting.   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            Breakfast Lunch Dinner Snacks Fluids   Reviewed            REVIEW OF SYSTEMS  GENERAL HEALTH: feels well otherwise, denied any fevers chills or night sweats   RESPIRATORY: denies shortness of breath   CARDIOVASCULAR: denies chest pain  GI: denies abdominal pain    EXAM  /76   Pulse 89   Resp 22   Ht 5' 5\" (1.651 m)   Wt 209 lb (94.8 kg)   LMP 06/22/2024   BMI 34.78 kg/m²   GENERAL: well developed, well nourished,in no apparent distress, A/O x3  SKIN: no rashes,no suspicious lesions  HEENT: atraumatic, normocephalic, OP-clear, PERRL  NECK: supple,no adenopathy  LUNGS: clear to auscultation bilaterally   CARDIO: RRR without murmur  GI: good BS's,NT/ND, no masses or HSM  EXTREMITIES: no cyanosis, no clubbing, no edema      Lab Results   Component Value Date    WBC 5.7 04/01/2017    RBC 4.93 (H) 04/01/2017    HGB 14.2 04/01/2017    HCT 41.4 04/01/2017    MCV 84.0 04/01/2017    MCH 28.8 04/01/2017    MCHC 34.3 04/01/2017    RDW 12.3 04/01/2017    .0 04/01/2017     Lab Results   Component  Value Date    GLU 91 12/15/2021    BUN 12.0 12/15/2021    BUNCREA 17.0 12/15/2021    CREATSERUM 0.69 12/15/2021    ANIONGAP 7 08/31/2019    GFR 83 04/01/2017    GFRNAA 106 07/06/2022    GFRAA 123 07/06/2022    CA 9.5 12/15/2021    OSMOCALC 280 08/31/2019    ALKPHO 73 12/15/2021    AST 16 12/15/2021    ALT 9 12/15/2021    BILT 0.27 12/15/2021    TP 7.2 12/15/2021    ALB 4.3 12/15/2021    GLOBULIN 4.0 08/31/2019     12/15/2021    K 4.14 12/15/2021     12/15/2021    CO2 18.4 (L) 12/15/2021     No results found for: \"EAG\", \"A1C\"  Lab Results   Component Value Date    CHOLEST 119 04/01/2017    TRIG 53 04/01/2017    HDL 46 04/01/2017    LDL 62 04/01/2017    VLDL 11 04/01/2017    TCHDLRATIO 2.59 04/01/2017    NONHDLC 73 04/01/2017     Lab Results   Component Value Date    TSH 1.710 04/01/2017     No results found for: \"B12\", \"VITB12\"  Lab Results   Component Value Date    VITD 23.9 (L) 04/01/2017       Current Outpatient Medications on File Prior to Visit   Medication Sig Dispense Refill    Tirzepatide-Weight Management (ZEPBOUND) 10 MG/0.5ML Subcutaneous Solution Auto-injector Inject 10 mg into the skin once a week. 2 mL 2    Acetylcysteine 600 MG Oral Cap Take 600 mg by mouth 2 (two) times daily.      acetaZOLAMIDE 250 MG Oral Tab Take 1 tablet (250 mg total) by mouth 3 (three) times daily.      TOPIRAMATE 25 MG Oral Tab TAKE 1 TABLET BY MOUTH TWICE A  tablet 2     No current facility-administered medications on file prior to visit.       ASSESSMENT  Analyzed weight data:       Diagnoses and all orders for this visit:    Therapeutic drug monitoring    Morbid obesity (HCC)    Vitamin D deficiency    Pseudotumor cerebri    Acanthosis nigricans    Other orders  -     Tirzepatide-Weight Management (ZEPBOUND) 12.5 MG/0.5ML Subcutaneous Solution Auto-injector; Inject 12.5 mg into the skin once a week for 4 doses.          PLAN     Wt Readings from Last 6 Encounters:   02/25/25 209 lb (94.8 kg)   07/09/24 234  lb (106.1 kg)   02/20/24 245 lb (111.1 kg)   10/27/22 231 lb (104.8 kg)   03/16/22 210 lb (95.3 kg)   12/15/21 196 lb (88.9 kg) (97%, Z= 1.88)*     * Growth percentiles are based on [facility] (Girls, 2-20 Years) data.   Total time spent on chart review, pre-charting, obtaining history, counseling, and educating, reviewing labs was 30 minutes.  Weight max 245 lb 2/2024   Continue zepbound with dose increase to 12.5 mg weekly   -advised of side effects and adverse effects of this medication  Doing daily walks  Goal: strength training!!  Increase protein  Headaches have continued to improve significantly -Oct visit with Dr. Doug Sanders -stable!   Nutrition: low carb diet/ recommended to eat breakfast daily/ regular protein intake  Medication use and side effects reviewed with patient.  Medication contraindications: n/a  Follow up with dietitian and psychologist as recommended.  Discussed the role of sleep and stress in weight management.  Counseled on comprehensive weight loss plan including attention to nutrition, exercise and behavior/stress management for success. See patient instruction below for more details.  Discussed strategies to overcome barriers to successful weight loss and weight maintenance  FITTE: ACSM recommendations (150-300 minutes/ week in active weight loss)   Weight Loss consent to treat reviewed and signed    There are no Patient Instructions on file for this visit.    No follow-ups on file.    Patient verbalizes understanding.    Jolie Batista MD

## 2025-04-25 NOTE — TELEPHONE ENCOUNTER
Zepbound 12.5 mg ordered 2/25/25 for 3 months  Insurance is now requesting a new pa - she needs to move up please.  She said she tolerated 12.5 mg fine    Future Appointments   Date Time Provider Department Center   5/21/2025  9:00 AM Maine Thomas APN [facility]   9/11/2025  9:40 AM Jolie Batista MD [facility]   12/15/2025 11:20 AM Jolie Batista MD [facility]

## 2025-07-07 NOTE — TELEPHONE ENCOUNTER
NS- refill    Requesting Zepbound 15mg  LOV: 2/25/25  RTC: no follow-ups on file  Last Relevant Labs: n/a  Filled: 4/28/25 Zepbound 15mg  #2ml with 1 refills    Future Appointments   Date Time Provider Department Center   9/11/2025  9:40 AM Jolie Batista MD [facility] [address]   12/15/2025 11:20 AM Jolie Batista MD [facility]

## (undated) NOTE — LETTER
September 6, 2017    Patient: Aren Frey   Date of Visit: 9/6/2017       To Whom It May Concern:    Aren Frey was seen and treated in our emergency department on 9/6/2017.  She should not participate in gym/sports until [employer] are removed,

## (undated) NOTE — LETTER
Date: 4/14/2019    Patient Name: Najma Tai          To Whom it may concern:     This letter has been written at the patient's request. The above patient was seen at the [facility] for a TB skin test. Test was performed 4/11/19 and ki

## (undated) NOTE — LETTER
Date: 4/22/2019    Patient Name: Waldo Ayala          To Whom it may concern:     This letter has been written at the patient's request. The above patient was seen at the [facility] for a two step TB test. Test one was 4/11/19 and test

## (undated) NOTE — ED AVS SNAPSHOT
Tonny Spring   MRN: [id number]    Department:  [facility] Emergency Department   Date of Visit:  9/6/2017           Disclosure     Insurance plans vary and the physician(s) referred by the ER may not be covered by your plan.  Please contact you If you have been prescribed any medication(s), please fill your prescription right away and begin taking the medication(s) as directed    If the emergency physician has read X-rays, these will be re-interpreted by a radiologist.  If there is a significant